# Patient Record
Sex: MALE | Race: WHITE | NOT HISPANIC OR LATINO | Employment: FULL TIME | URBAN - METROPOLITAN AREA
[De-identification: names, ages, dates, MRNs, and addresses within clinical notes are randomized per-mention and may not be internally consistent; named-entity substitution may affect disease eponyms.]

---

## 2021-01-13 ENCOUNTER — OFFICE VISIT (OUTPATIENT)
Dept: URGENT CARE | Facility: CLINIC | Age: 26
End: 2021-01-13
Payer: COMMERCIAL

## 2021-01-13 VITALS
HEART RATE: 75 BPM | SYSTOLIC BLOOD PRESSURE: 113 MMHG | HEIGHT: 68 IN | RESPIRATION RATE: 18 BRPM | BODY MASS INDEX: 23.49 KG/M2 | OXYGEN SATURATION: 96 % | TEMPERATURE: 99 F | DIASTOLIC BLOOD PRESSURE: 73 MMHG | WEIGHT: 155 LBS

## 2021-01-13 DIAGNOSIS — Z20.822 EXPOSURE TO COVID-19 VIRUS: Primary | ICD-10-CM

## 2021-01-13 LAB — SARS-COV-2 AG RESP QL IA.RAPID: NEGATIVE

## 2021-01-13 PROCEDURE — 99202 OFFICE O/P NEW SF 15 MIN: CPT | Mod: S$GLB,,, | Performed by: NURSE PRACTITIONER

## 2021-01-13 PROCEDURE — 99202 PR OFFICE/OUTPT VISIT, NEW, LEVL II, 15-29 MIN: ICD-10-PCS | Mod: S$GLB,,, | Performed by: NURSE PRACTITIONER

## 2021-01-13 PROCEDURE — 87426 SARS CORONAVIRUS 2 ANTIGEN POCT: ICD-10-PCS | Mod: QW,S$GLB,, | Performed by: NURSE PRACTITIONER

## 2021-01-13 PROCEDURE — 3008F PR BODY MASS INDEX (BMI) DOCUMENTED: ICD-10-PCS | Mod: S$GLB,,, | Performed by: NURSE PRACTITIONER

## 2021-01-13 PROCEDURE — 3008F BODY MASS INDEX DOCD: CPT | Mod: S$GLB,,, | Performed by: NURSE PRACTITIONER

## 2021-01-13 PROCEDURE — 87426 SARSCOV CORONAVIRUS AG IA: CPT | Mod: QW,S$GLB,, | Performed by: NURSE PRACTITIONER

## 2021-01-13 RX ORDER — ERGOCALCIFEROL 1.25 MG/1
CAPSULE ORAL
COMMUNITY
Start: 2020-12-27 | End: 2022-05-26

## 2021-10-01 ENCOUNTER — OFFICE VISIT (OUTPATIENT)
Dept: URGENT CARE | Facility: CLINIC | Age: 26
End: 2021-10-01
Payer: COMMERCIAL

## 2021-10-01 VITALS
DIASTOLIC BLOOD PRESSURE: 85 MMHG | SYSTOLIC BLOOD PRESSURE: 122 MMHG | RESPIRATION RATE: 18 BRPM | BODY MASS INDEX: 23.49 KG/M2 | WEIGHT: 155 LBS | TEMPERATURE: 98 F | HEART RATE: 83 BPM | HEIGHT: 68 IN | OXYGEN SATURATION: 98 %

## 2021-10-01 DIAGNOSIS — J02.9 SORE THROAT: Primary | ICD-10-CM

## 2021-10-01 LAB
CTP QC/QA: YES
S PYO RRNA THROAT QL PROBE: NEGATIVE

## 2021-10-01 PROCEDURE — 3008F BODY MASS INDEX DOCD: CPT | Mod: S$GLB,,, | Performed by: NURSE PRACTITIONER

## 2021-10-01 PROCEDURE — 1159F MED LIST DOCD IN RCRD: CPT | Mod: S$GLB,,, | Performed by: NURSE PRACTITIONER

## 2021-10-01 PROCEDURE — 3074F SYST BP LT 130 MM HG: CPT | Mod: S$GLB,,, | Performed by: NURSE PRACTITIONER

## 2021-10-01 PROCEDURE — 3008F PR BODY MASS INDEX (BMI) DOCUMENTED: ICD-10-PCS | Mod: S$GLB,,, | Performed by: NURSE PRACTITIONER

## 2021-10-01 PROCEDURE — 3079F DIAST BP 80-89 MM HG: CPT | Mod: S$GLB,,, | Performed by: NURSE PRACTITIONER

## 2021-10-01 PROCEDURE — 87880 POCT RAPID STREP A: ICD-10-PCS | Mod: QW,,, | Performed by: NURSE PRACTITIONER

## 2021-10-01 PROCEDURE — 87880 STREP A ASSAY W/OPTIC: CPT | Mod: QW,,, | Performed by: NURSE PRACTITIONER

## 2021-10-01 PROCEDURE — 3074F PR MOST RECENT SYSTOLIC BLOOD PRESSURE < 130 MM HG: ICD-10-PCS | Mod: S$GLB,,, | Performed by: NURSE PRACTITIONER

## 2021-10-01 PROCEDURE — 99213 OFFICE O/P EST LOW 20 MIN: CPT | Mod: S$GLB,,, | Performed by: NURSE PRACTITIONER

## 2021-10-01 PROCEDURE — 1159F PR MEDICATION LIST DOCUMENTED IN MEDICAL RECORD: ICD-10-PCS | Mod: S$GLB,,, | Performed by: NURSE PRACTITIONER

## 2021-10-01 PROCEDURE — 1160F RVW MEDS BY RX/DR IN RCRD: CPT | Mod: S$GLB,,, | Performed by: NURSE PRACTITIONER

## 2021-10-01 PROCEDURE — 3079F PR MOST RECENT DIASTOLIC BLOOD PRESSURE 80-89 MM HG: ICD-10-PCS | Mod: S$GLB,,, | Performed by: NURSE PRACTITIONER

## 2021-10-01 PROCEDURE — 1160F PR REVIEW ALL MEDS BY PRESCRIBER/CLIN PHARMACIST DOCUMENTED: ICD-10-PCS | Mod: S$GLB,,, | Performed by: NURSE PRACTITIONER

## 2021-10-01 PROCEDURE — 99213 PR OFFICE/OUTPT VISIT, EST, LEVL III, 20-29 MIN: ICD-10-PCS | Mod: S$GLB,,, | Performed by: NURSE PRACTITIONER

## 2022-05-26 ENCOUNTER — OFFICE VISIT (OUTPATIENT)
Dept: FAMILY MEDICINE | Facility: CLINIC | Age: 27
End: 2022-05-26
Payer: COMMERCIAL

## 2022-05-26 VITALS
SYSTOLIC BLOOD PRESSURE: 122 MMHG | WEIGHT: 171.5 LBS | HEART RATE: 79 BPM | TEMPERATURE: 98 F | BODY MASS INDEX: 25.99 KG/M2 | OXYGEN SATURATION: 96 % | RESPIRATION RATE: 16 BRPM | HEIGHT: 68 IN | DIASTOLIC BLOOD PRESSURE: 76 MMHG

## 2022-05-26 DIAGNOSIS — E66.3 OVERWEIGHT (BMI 25.0-29.9): ICD-10-CM

## 2022-05-26 DIAGNOSIS — G43.901 MIGRAINE WITH STATUS MIGRAINOSUS, NOT INTRACTABLE, UNSPECIFIED MIGRAINE TYPE: ICD-10-CM

## 2022-05-26 DIAGNOSIS — Z76.89 ENCOUNTER TO ESTABLISH CARE: Primary | ICD-10-CM

## 2022-05-26 DIAGNOSIS — R11.2 NAUSEA AND VOMITING, INTRACTABILITY OF VOMITING NOT SPECIFIED, UNSPECIFIED VOMITING TYPE: ICD-10-CM

## 2022-05-26 PROBLEM — J35.8 TONSILLITH: Status: ACTIVE | Noted: 2018-07-16

## 2022-05-26 PROCEDURE — 3008F PR BODY MASS INDEX (BMI) DOCUMENTED: ICD-10-PCS | Mod: S$GLB,,, | Performed by: FAMILY MEDICINE

## 2022-05-26 PROCEDURE — 1160F RVW MEDS BY RX/DR IN RCRD: CPT | Mod: S$GLB,,, | Performed by: FAMILY MEDICINE

## 2022-05-26 PROCEDURE — 1159F PR MEDICATION LIST DOCUMENTED IN MEDICAL RECORD: ICD-10-PCS | Mod: S$GLB,,, | Performed by: FAMILY MEDICINE

## 2022-05-26 PROCEDURE — 3078F DIAST BP <80 MM HG: CPT | Mod: S$GLB,,, | Performed by: FAMILY MEDICINE

## 2022-05-26 PROCEDURE — 3074F PR MOST RECENT SYSTOLIC BLOOD PRESSURE < 130 MM HG: ICD-10-PCS | Mod: S$GLB,,, | Performed by: FAMILY MEDICINE

## 2022-05-26 PROCEDURE — 1159F MED LIST DOCD IN RCRD: CPT | Mod: S$GLB,,, | Performed by: FAMILY MEDICINE

## 2022-05-26 PROCEDURE — 99203 PR OFFICE/OUTPT VISIT, NEW, LEVL III, 30-44 MIN: ICD-10-PCS | Mod: S$GLB,,, | Performed by: FAMILY MEDICINE

## 2022-05-26 PROCEDURE — 99203 OFFICE O/P NEW LOW 30 MIN: CPT | Mod: S$GLB,,, | Performed by: FAMILY MEDICINE

## 2022-05-26 PROCEDURE — 3008F BODY MASS INDEX DOCD: CPT | Mod: S$GLB,,, | Performed by: FAMILY MEDICINE

## 2022-05-26 PROCEDURE — 99999 PR PBB SHADOW E&M-EST. PATIENT-LVL IV: CPT | Mod: PBBFAC,,, | Performed by: FAMILY MEDICINE

## 2022-05-26 PROCEDURE — 3078F PR MOST RECENT DIASTOLIC BLOOD PRESSURE < 80 MM HG: ICD-10-PCS | Mod: S$GLB,,, | Performed by: FAMILY MEDICINE

## 2022-05-26 PROCEDURE — 1160F PR REVIEW ALL MEDS BY PRESCRIBER/CLIN PHARMACIST DOCUMENTED: ICD-10-PCS | Mod: S$GLB,,, | Performed by: FAMILY MEDICINE

## 2022-05-26 PROCEDURE — 3074F SYST BP LT 130 MM HG: CPT | Mod: S$GLB,,, | Performed by: FAMILY MEDICINE

## 2022-05-26 PROCEDURE — 99999 PR PBB SHADOW E&M-EST. PATIENT-LVL IV: ICD-10-PCS | Mod: PBBFAC,,, | Performed by: FAMILY MEDICINE

## 2022-05-26 RX ORDER — UBROGEPANT 100 MG/1
TABLET ORAL
COMMUNITY
Start: 2022-04-13 | End: 2022-05-26 | Stop reason: SDUPTHER

## 2022-05-26 RX ORDER — UBROGEPANT 100 MG/1
100 TABLET ORAL DAILY PRN
Qty: 10 TABLET | Refills: 2 | Status: SHIPPED | OUTPATIENT
Start: 2022-05-26

## 2022-05-26 RX ORDER — PROMETHAZINE HYDROCHLORIDE 12.5 MG/1
12.5 TABLET ORAL EVERY 6 HOURS PRN
Qty: 30 TABLET | Refills: 1 | Status: SHIPPED | OUTPATIENT
Start: 2022-05-26 | End: 2023-06-07

## 2022-05-26 NOTE — PROGRESS NOTES
Subjective:       Patient ID: Alex Porras is a 26 y.o. male.    Chief Complaint: Establish Care, Migraine, and Referral    This patient is new to me and new to the clinic.   Alex Porras presents to the clinic today to establish care and get a referral to a new neurologist. Patient states he previously saw Dr Oneil but is wanting a second opinion. Patient reports he gets migraines frequently; sometimes 5 a month, and sometimes skips a month. Patient states he takes Zofran when he gets nauseous with the migraines but it doesn't help all the time. Patient reports he needs medication refills today as well.   Patient educated on plan of care, verbalized understanding.     Review of Systems   Constitutional: Negative for activity change, appetite change, chills, diaphoresis and fever.   HENT: Negative for congestion, ear pain, postnasal drip, sinus pressure, sneezing and sore throat.    Eyes: Negative for pain, discharge, redness and itching.   Respiratory: Negative for apnea, cough, chest tightness, shortness of breath and wheezing.    Cardiovascular: Negative for chest pain and leg swelling.   Gastrointestinal: Positive for nausea and vomiting. Negative for abdominal distention, abdominal pain, constipation and diarrhea.   Genitourinary: Negative for difficulty urinating, dysuria, flank pain and frequency.   Skin: Negative for color change, rash and wound.   Neurological: Positive for headaches. Negative for dizziness.       Patient Active Problem List   Diagnosis    Tonsillith       Objective:      Physical Exam  Vitals reviewed.   Constitutional:       General: He is not in acute distress.     Appearance: Normal appearance. He is well-developed.   HENT:      Head: Normocephalic.      Nose: Nose normal.   Eyes:      Conjunctiva/sclera: Conjunctivae normal.      Pupils: Pupils are equal, round, and reactive to light.   Pulmonary:      Effort: Pulmonary effort is normal. No respiratory distress.  "  Musculoskeletal:      Cervical back: Normal range of motion and neck supple.   Skin:     General: Skin is warm and dry.      Findings: No rash.   Neurological:      Mental Status: He is alert and oriented to person, place, and time.   Psychiatric:         Mood and Affect: Mood normal.         Behavior: Behavior normal.         No results found for: WBC, HGB, HCT, PLT, CHOL, TRIG, HDL, LDLDIRECT, ALT, AST, NA, K, CL, CREATININE, BUN, CO2, TSH, PSA, INR, GLUF, HGBA1C, MICROALBUR  The ASCVD Risk score (Tony TRISTAN Jr., et al., 2013) failed to calculate for the following reasons:    The 2013 ASCVD risk score is only valid for ages 40 to 79  Visit Vitals  /76 (BP Location: Left arm, Patient Position: Sitting, BP Method: Medium (Manual))   Pulse 79   Temp 98 °F (36.7 °C) (Oral)   Resp 16   Ht 5' 8" (1.727 m)   Wt 77.8 kg (171 lb 8.3 oz)   SpO2 96%   BMI 26.08 kg/m²      Assessment:       1. Encounter to establish care    2. Overweight (BMI 25.0-29.9)    3. Migraine with status migrainosus, not intractable, unspecified migraine type    4. Nausea and vomiting, intractability of vomiting not specified, unspecified vomiting type        Plan:       Alex was seen today for establish care, migraine and referral.    Diagnoses and all orders for this visit:    Encounter to establish care    Overweight (BMI 25.0-29.9)  Body mass index is 26.08 kg/m².  Continue healthy diet and regular exercise as tolerated.  Continue medications as prescribed.  Follow up with PCP     Migraine with status migrainosus, not intractable, unspecified migraine type / Nausea and vomiting, intractability of vomiting not specified, unspecified vomiting type  -     Ambulatory referral/consult to Neurology; Future  -     ubrogepant (UBROGEPANT) 100 mg tablet; Take 1 tablet (100 mg total) by mouth daily as needed for Migraine.  -     promethazine (PHENERGAN) 12.5 MG Tab; Take 1 tablet (12.5 mg total) by mouth every 6 (six) hours as needed " (nausea).  Continue medications as prescribed.  Follow up with PCP     Follow up if symptoms worsen or fail to improve.

## 2022-05-31 ENCOUNTER — TELEPHONE (OUTPATIENT)
Dept: FAMILY MEDICINE | Facility: CLINIC | Age: 27
End: 2022-05-31
Payer: COMMERCIAL

## 2022-05-31 NOTE — TELEPHONE ENCOUNTER
Called and spoke with patient himself - relayed to him that information has been sent over to Dr. Soliman as requested to both listed fax on file as well as the requested fax number. Patient verbalized understanding. No additional questions or concerns at this time.

## 2022-05-31 NOTE — TELEPHONE ENCOUNTER
----- Message from Qian Hdz sent at 5/31/2022 10:05 AM CDT -----  Regarding: advice  Contact: Mom/Yessy/[phone] (home)  Type: Needs Medical Advice  Who Called:  Mom/Yessy/[phone] (home)       Additional Information: Office sent over the referral for a neurologist, Dr. Matt Soliman but did not include any clinical notes and anything regarding patient's migraines. They need this. Please fax to fax [phone]. Please call mom when completed.